# Patient Record
Sex: FEMALE | Race: WHITE | URBAN - METROPOLITAN AREA
[De-identification: names, ages, dates, MRNs, and addresses within clinical notes are randomized per-mention and may not be internally consistent; named-entity substitution may affect disease eponyms.]

---

## 2020-06-02 ENCOUNTER — APPOINTMENT (RX ONLY)
Dept: URBAN - METROPOLITAN AREA CLINIC 344 | Facility: CLINIC | Age: 70
Setting detail: DERMATOLOGY
End: 2020-06-02

## 2020-06-02 DIAGNOSIS — L65.9 NONSCARRING HAIR LOSS, UNSPECIFIED: ICD-10-CM

## 2020-06-02 DIAGNOSIS — L30.9 DERMATITIS, UNSPECIFIED: ICD-10-CM

## 2020-06-02 PROCEDURE — 99202 OFFICE O/P NEW SF 15 MIN: CPT

## 2020-06-02 PROCEDURE — ? COUNSELING

## 2020-06-02 PROCEDURE — ? TREATMENT REGIMEN

## 2020-06-02 PROCEDURE — ? PRESCRIPTION

## 2020-06-02 RX ORDER — TRIAMCINOLONE ACETONIDE 1 MG/G
CREAM TOPICAL
Qty: 1 | Refills: 3 | Status: ERX | COMMUNITY
Start: 2020-06-02

## 2020-06-02 RX ORDER — FLUOCINONIDE 0.5 MG/ML
SOLUTION TOPICAL
Qty: 1 | Refills: 2 | Status: ERX | COMMUNITY
Start: 2020-06-02

## 2020-06-02 RX ADMIN — TRIAMCINOLONE ACETONIDE: 1 CREAM TOPICAL at 00:00

## 2020-06-02 RX ADMIN — FLUOCINONIDE: 0.5 SOLUTION TOPICAL at 00:00

## 2020-06-02 ASSESSMENT — LOCATION SIMPLE DESCRIPTION DERM
LOCATION SIMPLE: ANTERIOR SCALP
LOCATION SIMPLE: LEFT SMALL FINGER

## 2020-06-02 ASSESSMENT — LOCATION DETAILED DESCRIPTION DERM
LOCATION DETAILED: LEFT SMALL FINGER PROXIMAL INTERPHALANGEAL JOINT
LOCATION DETAILED: MID-FRONTAL SCALP

## 2020-06-02 ASSESSMENT — LOCATION ZONE DERM
LOCATION ZONE: SCALP
LOCATION ZONE: FINGER

## 2020-08-24 ENCOUNTER — APPOINTMENT (RX ONLY)
Dept: URBAN - METROPOLITAN AREA CLINIC 344 | Facility: CLINIC | Age: 70
Setting detail: DERMATOLOGY
End: 2020-08-24

## 2020-08-24 DIAGNOSIS — D22 MELANOCYTIC NEVI: ICD-10-CM

## 2020-08-24 DIAGNOSIS — Z41.3 ENCOUNTER FOR EAR PIERCING: ICD-10-CM

## 2020-08-24 DIAGNOSIS — D18.0 HEMANGIOMA: ICD-10-CM

## 2020-08-24 DIAGNOSIS — L82.1 OTHER SEBORRHEIC KERATOSIS: ICD-10-CM

## 2020-08-24 DIAGNOSIS — L81.4 OTHER MELANIN HYPERPIGMENTATION: ICD-10-CM

## 2020-08-24 DIAGNOSIS — L65.9 NONSCARRING HAIR LOSS, UNSPECIFIED: ICD-10-CM | Status: IMPROVED

## 2020-08-24 PROBLEM — D22.5 MELANOCYTIC NEVI OF TRUNK: Status: ACTIVE | Noted: 2020-08-24

## 2020-08-24 PROBLEM — D23.39 OTHER BENIGN NEOPLASM OF SKIN OF OTHER PARTS OF FACE: Status: ACTIVE | Noted: 2020-08-24

## 2020-08-24 PROBLEM — D18.01 HEMANGIOMA OF SKIN AND SUBCUTANEOUS TISSUE: Status: ACTIVE | Noted: 2020-08-24

## 2020-08-24 PROCEDURE — ? COUNSELING

## 2020-08-24 PROCEDURE — ? MEDICATION COUNSELING

## 2020-08-24 PROCEDURE — ? OTHER

## 2020-08-24 PROCEDURE — ? TREATMENT REGIMEN

## 2020-08-24 PROCEDURE — 99214 OFFICE O/P EST MOD 30 MIN: CPT

## 2020-08-24 PROCEDURE — ? BENIGN DESTRUCTION COSMETIC

## 2020-08-24 PROCEDURE — ? SUNSCREEN RECOMMENDATIONS

## 2020-08-24 PROCEDURE — ? DEFER

## 2020-08-24 ASSESSMENT — LOCATION DETAILED DESCRIPTION DERM
LOCATION DETAILED: RIGHT SUPERIOR PARIETAL SCALP
LOCATION DETAILED: PERIUMBILICAL SKIN
LOCATION DETAILED: RIGHT INFERIOR UPPER BACK
LOCATION DETAILED: RIGHT RIB CAGE

## 2020-08-24 ASSESSMENT — LOCATION SIMPLE DESCRIPTION DERM
LOCATION SIMPLE: ABDOMEN
LOCATION SIMPLE: SCALP
LOCATION SIMPLE: RIGHT UPPER BACK

## 2020-08-24 ASSESSMENT — LOCATION ZONE DERM
LOCATION ZONE: TRUNK
LOCATION ZONE: SCALP

## 2020-08-24 NOTE — PROCEDURE: MEDICATION COUNSELING
Xelesauz Pregnancy And Lactation Text: This medication is Pregnancy Category D and is not considered safe during pregnancy.  The risk during breast feeding is also uncertain.

## 2020-08-24 NOTE — PROCEDURE: TREATMENT REGIMEN
Modify Regimen: Fluocinonide solution only 1-2x weekly at bedtime- call for refills
Detail Level: Zone
Continue Regimen: Viviscal shampoo \\nBiotin supplements

## 2020-08-24 NOTE — PROCEDURE: DEFER
Detail Level: Detailed
Scheduling Instructions (Optional): ELVA
Introduction Text (Please End With A Colon): The following procedure was deferred:

## 2020-08-24 NOTE — PROCEDURE: OTHER
Detail Level: Zone
Other (Free Text): No charge per MS
Note Text (......Xxx Chief Complaint.): This diagnosis correlates with the

## 2020-08-24 NOTE — PROCEDURE: MIPS QUALITY
Quality 130: Documentation Of Current Medications In The Medical Record: Current Medications with Name, Dosage, Frequency, or Route not Documented, Reason not Given
Quality 110: Preventive Care And Screening: Influenza Immunization: Influenza Immunization Administered during Influenza season
Detail Level: Detailed
Quality 226: Preventive Care And Screening: Tobacco Use: Screening And Cessation Intervention: Patient screened for tobacco use and is an ex/non-smoker